# Patient Record
Sex: MALE | Race: BLACK OR AFRICAN AMERICAN | NOT HISPANIC OR LATINO | Employment: FULL TIME | URBAN - METROPOLITAN AREA
[De-identification: names, ages, dates, MRNs, and addresses within clinical notes are randomized per-mention and may not be internally consistent; named-entity substitution may affect disease eponyms.]

---

## 2020-08-29 ENCOUNTER — LAB VISIT (OUTPATIENT)
Dept: PRIMARY CARE CLINIC | Facility: OTHER | Age: 55
End: 2020-08-29
Attending: INTERNAL MEDICINE

## 2020-08-29 DIAGNOSIS — Z03.818 ENCOUNTER FOR OBSERVATION FOR SUSPECTED EXPOSURE TO OTHER BIOLOGICAL AGENTS RULED OUT: Primary | ICD-10-CM

## 2020-08-29 PROCEDURE — U0003 INFECTIOUS AGENT DETECTION BY NUCLEIC ACID (DNA OR RNA); SEVERE ACUTE RESPIRATORY SYNDROME CORONAVIRUS 2 (SARS-COV-2) (CORONAVIRUS DISEASE [COVID-19]), AMPLIFIED PROBE TECHNIQUE, MAKING USE OF HIGH THROUGHPUT TECHNOLOGIES AS DESCRIBED BY CMS-2020-01-R: HCPCS

## 2020-08-30 DIAGNOSIS — U07.1 COVID-19 VIRUS DETECTED: ICD-10-CM

## 2020-08-30 LAB — SARS-COV-2 RNA RESP QL NAA+PROBE: DETECTED

## 2024-01-09 ENCOUNTER — OFFICE VISIT (OUTPATIENT)
Dept: UROLOGY | Facility: CLINIC | Age: 59
End: 2024-01-09
Payer: COMMERCIAL

## 2024-01-09 ENCOUNTER — LAB VISIT (OUTPATIENT)
Dept: LAB | Facility: HOSPITAL | Age: 59
End: 2024-01-09
Attending: NURSE PRACTITIONER
Payer: COMMERCIAL

## 2024-01-09 VITALS
SYSTOLIC BLOOD PRESSURE: 114 MMHG | RESPIRATION RATE: 16 BRPM | DIASTOLIC BLOOD PRESSURE: 86 MMHG | WEIGHT: 246.5 LBS | HEART RATE: 96 BPM | BODY MASS INDEX: 35.29 KG/M2 | HEIGHT: 70 IN

## 2024-01-09 DIAGNOSIS — C61 PROSTATE CANCER: Primary | ICD-10-CM

## 2024-01-09 DIAGNOSIS — C61 PROSTATE CANCER: ICD-10-CM

## 2024-01-09 PROCEDURE — 36415 COLL VENOUS BLD VENIPUNCTURE: CPT | Performed by: NURSE PRACTITIONER

## 2024-01-09 PROCEDURE — 99205 OFFICE O/P NEW HI 60 MIN: CPT | Mod: S$GLB,,, | Performed by: NURSE PRACTITIONER

## 2024-01-09 PROCEDURE — 1159F MED LIST DOCD IN RCRD: CPT | Mod: CPTII,S$GLB,, | Performed by: NURSE PRACTITIONER

## 2024-01-09 PROCEDURE — 1160F RVW MEDS BY RX/DR IN RCRD: CPT | Mod: CPTII,S$GLB,, | Performed by: NURSE PRACTITIONER

## 2024-01-09 PROCEDURE — 3074F SYST BP LT 130 MM HG: CPT | Mod: CPTII,S$GLB,, | Performed by: NURSE PRACTITIONER

## 2024-01-09 PROCEDURE — 3008F BODY MASS INDEX DOCD: CPT | Mod: CPTII,S$GLB,, | Performed by: NURSE PRACTITIONER

## 2024-01-09 PROCEDURE — 84153 ASSAY OF PSA TOTAL: CPT | Performed by: NURSE PRACTITIONER

## 2024-01-09 PROCEDURE — 4010F ACE/ARB THERAPY RXD/TAKEN: CPT | Mod: CPTII,S$GLB,, | Performed by: NURSE PRACTITIONER

## 2024-01-09 PROCEDURE — 99999 PR PBB SHADOW E&M-NEW PATIENT-LVL IV: CPT | Mod: PBBFAC,,, | Performed by: NURSE PRACTITIONER

## 2024-01-09 PROCEDURE — 3079F DIAST BP 80-89 MM HG: CPT | Mod: CPTII,S$GLB,, | Performed by: NURSE PRACTITIONER

## 2024-01-09 RX ORDER — OMEPRAZOLE 20 MG/1
20 TABLET, DELAYED RELEASE ORAL DAILY
COMMUNITY

## 2024-01-09 RX ORDER — AMLODIPINE BESYLATE 10 MG/1
10 TABLET ORAL DAILY
COMMUNITY

## 2024-01-09 RX ORDER — METFORMIN HYDROCHLORIDE 500 MG/1
500 TABLET ORAL 2 TIMES DAILY WITH MEALS
COMMUNITY

## 2024-01-09 RX ORDER — HYDROCHLOROTHIAZIDE 12.5 MG/1
12.5 TABLET ORAL DAILY
COMMUNITY

## 2024-01-09 RX ORDER — METOPROLOL TARTRATE AND HYDROCHLOROTHIAZIDE 50; 25 MG/1; MG/1
1 TABLET ORAL DAILY
COMMUNITY

## 2024-01-09 RX ORDER — LOSARTAN POTASSIUM 50 MG/1
50 TABLET ORAL DAILY
COMMUNITY

## 2024-01-09 RX ORDER — TAMSULOSIN HYDROCHLORIDE 0.4 MG/1
CAPSULE ORAL DAILY
COMMUNITY

## 2024-01-09 NOTE — PROGRESS NOTES
Chief Complaint:   History of prostate cancer  History of renal stones  BPH  Erectile dysfunction    HPI:   Wilner Senior is a 58 y.o. male hx kidney stones, prostate cancer, erectile dysfunction and BPH. History of bilateral inguinal hernia repairs, is presenting today for possible exacerbation of right inguinal hernia.  Also history of prostate cancer currently undergoing brachytherapy and external beam radiation followed by Dr. Jeremías Leahy.  Diagnosed 12/27 2021.  States that he is changing urologists and providers to [facility].  Urine in clinic is negative PVR is 11 mL.  Denies gross hematuria.  Using daily Cialis with good results.  Denies adverse side effects to Cialis.  Is currently on tamsulosin with a complete resolution to BPH symptoms without adverse side effects.  History of renal stones, has been asymptomatic for 12 months. S/P ESWL and laser lithotripsy with stent placement.  Allergies:  Patient has no known allergies.    Medications:  has a current medication list which includes the following prescription(s): amlodipine, hydrochlorothiazide, losartan, metformin, metoprolol ta-hydrochlorothiaz, omeprazole, and tamsulosin.    Review of Systems:  General: No fever, chills, fatigability, or weight loss.  Skin: No rashes, itching, or changes in color or texture of skin.  Chest: Denies GREEN, cyanosis, wheezing, cough, and sputum production.  Abdomen: Appetite fine. No weight loss. Denies diarrhea, abdominal pain, hematemesis, or blood in stool.  Musculoskeletal: No joint stiffness or swelling. Denies back pain.  : As above.  All other review of systems negative.    PMH:  Essential hypertension   GERD (gastroesophageal reflux disease)   Arrhythmia   Controlled type 2 diabetes mellitus without complication (HCC)   Prostate cancer (HCC)   Renal stones    PSH:  Procedure Laterality Date   HERNIA REPAIR   x2   LITHOTRIPSY   ESWL x 1  Fracture right leg and right ring finger both treated without surgery  FamHx:    none  SocHx:  reports that he does not drink alcohol and does not use drugs. No history on file for tobacco use.      Physical Exam:  Vitals:    01/09/24 0926   BP: 114/86   Pulse: 96   Resp: 16     General: A&Ox3, no apparent distress, no deformities  Neck: No masses, normal thyroid  Lungs: normal inspiration, no use of accessory muscles  Heart: normal pulse, no arrhythmias  Abdomen: Soft, NT, ND, no masses, no hernias, no hepatosplenomegaly  Lymphatic: Neck and groin nodes negative  Skin: The skin is warm and dry. No jaundice.  Ext: No c/c/e.  : Test desc erika, no abnormalities of epididymus. Penis  with normal penile and scrotal skin. Meatus normal.  Rectal exam, deferred per patient     Labs/Studies:   See HPi    Impression/Plan:   1. History of prostate cancer  Will proceed with PSA labs today.  Patient to return to clinic in 1 week with medical records to review.  Patient has an extensive urological history.  Medical records will be updated and scanned into his EMR.    2. BPH  Remain on tamsulosin, see HPI    3. History of renal stones  Has been completely asymptomatic over the last 12 months.    4. Erectile dysfunction  Is currently taking daily Cialis with good results without adverse side effects.    5. History of bilateral inguinal hernia repair  Exam was unremarkable secondary to possible inguinal hernia exacerbation.    Radiation oncology consultation note (01/12/2022)  Hollie Emanuel MD  [facility]    Prostate biopsy showing Geason's 3+ 4   Brachytherapy   No androgen suppression was indicated

## 2024-01-10 ENCOUNTER — TELEPHONE (OUTPATIENT)
Dept: UROLOGY | Facility: CLINIC | Age: 59
End: 2024-01-10
Payer: COMMERCIAL

## 2024-01-10 DIAGNOSIS — C61 PROSTATE CANCER: Primary | ICD-10-CM

## 2024-01-10 LAB — COMPLEXED PSA SERPL-MCNC: 0.41 NG/ML (ref 0–4)

## 2024-01-16 ENCOUNTER — OFFICE VISIT (OUTPATIENT)
Dept: UROLOGY | Facility: CLINIC | Age: 59
End: 2024-01-16
Payer: COMMERCIAL

## 2024-01-16 DIAGNOSIS — C61 PROSTATE CANCER: Primary | ICD-10-CM

## 2024-01-16 PROCEDURE — 99214 OFFICE O/P EST MOD 30 MIN: CPT | Mod: S$GLB,,, | Performed by: NURSE PRACTITIONER

## 2024-01-16 PROCEDURE — 4010F ACE/ARB THERAPY RXD/TAKEN: CPT | Mod: CPTII,S$GLB,, | Performed by: NURSE PRACTITIONER

## 2024-01-16 PROCEDURE — 99999 PR PBB SHADOW E&M-EST. PATIENT-LVL II: CPT | Mod: PBBFAC,,, | Performed by: NURSE PRACTITIONER

## 2024-01-16 NOTE — PROGRESS NOTES
Chief Complaint:   Prostate cancer    HPI:   Patient is presenting as a follow-up to PSA.  Has history of prostate cancer;currently undergoing brachytherapy and external beam radiation followed by Dr. Jeremías Leahy.  Diagnosed 12/27 2021.  Patient brought in medical records secondary to radiation oncology plan of care.  Was reviewed and scanned into chart.  Recent PSA was 0.41.  01/09/2024  Wilner Senior is a 58 y.o. male hx kidney stones, prostate cancer, erectile dysfunction and BPH. History of bilateral inguinal hernia repairs, is presenting today for possible exacerbation of right inguinal hernia.  Also history of prostate cancer currently undergoing brachytherapy and external beam radiation followed by Dr. Jeremías Leahy.  Diagnosed 12/27 2021.  States that he is changing urologists and providers to [facility].  Urine in clinic is negative PVR is 11 mL.  Denies gross hematuria.  Using daily Cialis with good results.  Denies adverse side effects to Cialis.  Is currently on tamsulosin with a complete resolution to BPH symptoms without adverse side effects.  History of renal stones, has been asymptomatic for 12 months. S/P ESWL and laser lithotripsy with stent placement.   Allergies:  Patient has no known allergies.    Medications:  has a current medication list which includes the following prescription(s): amlodipine, hydrochlorothiazide, losartan, metformin, metoprolol ta-hydrochlorothiaz, omeprazole, and tamsulosin.    Review of Systems:  General: No fever, chills, fatigability, or weight loss.  Skin: No rashes, itching, or changes in color or texture of skin.  Chest: Denies GREEN, cyanosis, wheezing, cough, and sputum production.  Abdomen: Appetite fine. No weight loss. Denies diarrhea, abdominal pain, hematemesis, or blood in stool.  Musculoskeletal: No joint stiffness or swelling. Denies back pain.  : As above.  All other review of systems negative.    PMH:  Essential hypertension   GERD (gastroesophageal  reflux disease)   Arrhythmia   Controlled type 2 diabetes mellitus without complication (HCC)   Prostate cancer (HCC)   Renal stones     PSH:  Procedure Laterality Date   HERNIA REPAIR   x2   LITHOTRIPSY   ESWL x 1  Fracture right leg and right ring finger both treated without surgery  FamHx:   none  SocHx:  reports that he does not drink alcohol and does not use drugs. No history on file for tobacco use.      Physical Exam:  General: A&Ox3, no apparent distress, no deformities  Neck: No masses, normal thyroid  Lungs: normal inspiration, no use of accessory muscles  Heart: normal pulse, no arrhythmias  Abdomen: Soft, NT, ND, no masses, no hernias, no hepatosplenomegaly  Lymphatic: Neck and groin nodes negative    Labs/Studies:    Latest Reference Range & Units 01/09/24 10:10   PSA Diagnostic 0.00 - 4.00 ng/mL 0.41     Impression/Plan:      Radiation oncology consultation note (01/12/2022)  Hollie Emanuel MD  [facility]     Prostate biopsy showing Geason's 3+ 4   Brachytherapy   No androgen suppression was indicated    Reviewed recent PSA with patient and wife.  Will repeat PSA in 6 months and patient to return to clinic to review.  Will bring in urologist medical records for review, when available.  I will review medical records and information will be scanned into chart.

## 2024-03-20 ENCOUNTER — LAB VISIT (OUTPATIENT)
Dept: LAB | Facility: HOSPITAL | Age: 59
End: 2024-03-20
Attending: NURSE PRACTITIONER
Payer: COMMERCIAL

## 2024-03-20 ENCOUNTER — OFFICE VISIT (OUTPATIENT)
Dept: UROLOGY | Facility: CLINIC | Age: 59
End: 2024-03-20
Payer: COMMERCIAL

## 2024-03-20 VITALS
HEART RATE: 87 BPM | WEIGHT: 246.5 LBS | SYSTOLIC BLOOD PRESSURE: 121 MMHG | DIASTOLIC BLOOD PRESSURE: 73 MMHG | BODY MASS INDEX: 35.37 KG/M2

## 2024-03-20 DIAGNOSIS — R31.0 HEMATURIA, GROSS: ICD-10-CM

## 2024-03-20 DIAGNOSIS — R31.0 HEMATURIA, GROSS: Primary | ICD-10-CM

## 2024-03-20 LAB
CREAT SERPL-MCNC: 1 MG/DL (ref 0.5–1.4)
EST. GFR  (NO RACE VARIABLE): >60 ML/MIN/1.73 M^2

## 2024-03-20 PROCEDURE — 1159F MED LIST DOCD IN RCRD: CPT | Mod: CPTII,S$GLB,, | Performed by: NURSE PRACTITIONER

## 2024-03-20 PROCEDURE — 36415 COLL VENOUS BLD VENIPUNCTURE: CPT | Performed by: NURSE PRACTITIONER

## 2024-03-20 PROCEDURE — 3008F BODY MASS INDEX DOCD: CPT | Mod: CPTII,S$GLB,, | Performed by: NURSE PRACTITIONER

## 2024-03-20 PROCEDURE — 3074F SYST BP LT 130 MM HG: CPT | Mod: CPTII,S$GLB,, | Performed by: NURSE PRACTITIONER

## 2024-03-20 PROCEDURE — 82565 ASSAY OF CREATININE: CPT | Performed by: NURSE PRACTITIONER

## 2024-03-20 PROCEDURE — 99999 PR PBB SHADOW E&M-EST. PATIENT-LVL III: CPT | Mod: PBBFAC,,, | Performed by: NURSE PRACTITIONER

## 2024-03-20 PROCEDURE — 4010F ACE/ARB THERAPY RXD/TAKEN: CPT | Mod: CPTII,S$GLB,, | Performed by: NURSE PRACTITIONER

## 2024-03-20 PROCEDURE — 99214 OFFICE O/P EST MOD 30 MIN: CPT | Mod: S$GLB,,, | Performed by: NURSE PRACTITIONER

## 2024-03-20 PROCEDURE — 3078F DIAST BP <80 MM HG: CPT | Mod: CPTII,S$GLB,, | Performed by: NURSE PRACTITIONER

## 2024-03-20 NOTE — PROGRESS NOTES
Chief Complaint:   Prostate cancer   Gross hematuria    HPI:   Patient is presenting with complaints of painless, gross hematuria, several episodes over the weekend.  Has a history of prostate cancer, currently undergoing brachytherapy and external beam radiation followed by Dr. Jeremías Leahy. Diagnosed 12/27/2021.  Patient has a remote history of renal stones.  Denies flank pain.  Urine in clinic is negative.  No history of smoking.  01/16/2024  Patient is presenting as a follow-up to PSA.  Has history of prostate cancer;currently undergoing brachytherapy and external beam radiation followed by Dr. Jeremías Leahy.  Diagnosed 12/27/2021.  Patient brought in medical records secondary to radiation oncology plan of care.  Was reviewed and scanned into chart.  Recent PSA was 0.41.  01/09/2024  Wilner Senior is a 58 y.o. male hx kidney stones, prostate cancer, erectile dysfunction and BPH. History of bilateral inguinal hernia repairs, is presenting today for possible exacerbation of right inguinal hernia.  Also history of prostate cancer currently undergoing brachytherapy and external beam radiation followed by Dr. Jeremías Leahy.  Diagnosed 12/27 2021.  States that he is changing urologists and providers to [facility].  Urine in clinic is negative PVR is 11 mL.  Denies gross hematuria.  Using daily Cialis with good results.  Denies adverse side effects to Cialis.  Is currently on tamsulosin with a complete resolution to BPH symptoms without adverse side effects.  History of renal stones, has been asymptomatic for 12 months. S/P ESWL and laser lithotripsy with stent placement.   Allergies:  Patient has no known allergies.     Medications:  has a current medication list which includes the following prescription(s): amlodipine, hydrochlorothiazide, losartan, metformin, metoprolol ta-hydrochlorothiaz, omeprazole, and tamsulosin.     Review of Systems:  General: No fever, chills, fatigability, or weight loss.  Skin: No rashes,  itching, or changes in color or texture of skin.  Chest: Denies GREEN, cyanosis, wheezing, cough, and sputum production.  Abdomen: Appetite fine. No weight loss. Denies diarrhea, abdominal pain, hematemesis, or blood in stool.  Musculoskeletal: No joint stiffness or swelling. Denies back pain.  : As above.  All other review of systems negative.     PMH:  Essential hypertension   GERD (gastroesophageal reflux disease)   Arrhythmia   Controlled type 2 diabetes mellitus without complication (HCC)   Prostate cancer (HCC)   Renal stones     PSH:  Procedure Laterality Date   HERNIA REPAIR   x2   LITHOTRIPSY   ESWL x 1  Fracture right leg and right ring finger both treated without surgery  FamHx:   none  SocHx:  reports that he does not drink alcohol and does not use drugs. No history on file for tobacco use.       Physical Exam:  General: A&Ox3, no apparent distress, no deformities  Neck: No masses, normal thyroid  Lungs: normal inspiration, no use of accessory muscles  Heart: normal pulse, no arrhythmias  Abdomen: Soft, NT, ND, no masses, no hernias, no hepatosplenomegaly  Lymphatic: Neck and groin nodes negative     Labs/Studies:     Latest Reference Range & Units 01/09/24 10:10   PSA Diagnostic 0.00 - 4.00 ng/mL 0.41      Impression/Plan:   Radiation oncology consultation note (01/12/2022)  Hollie Emanuel MD  [facility]     Prostate biopsy showing Geason's 3+ 4   Brachytherapy   No androgen suppression was indicated     Reviewed recent PSA with patient and wife.  Will repeat PSA in 6 months and patient to return to clinic to review.  Will bring in urologist medical records for review, when available.  I will review medical records and information will be scanned into chart.    Gross hematuria with a history of prostate cancer  Will proceed with CT urogram and patient to return to clinic to discuss results with Dr. Awad.  Patient is undergoing prostate cancer treatment at this time, he is aware that  gross hematuria could be related to radiation cystitis.

## 2024-03-26 ENCOUNTER — TELEPHONE (OUTPATIENT)
Dept: UROLOGY | Facility: CLINIC | Age: 59
End: 2024-03-26
Payer: COMMERCIAL

## 2024-03-26 NOTE — TELEPHONE ENCOUNTER
I called and left a detailed message for the patient.     ----- Message from Donna Cruz NP sent at 3/26/2024  8:26 AM CDT -----  Please call   Kidney function labs are normal.

## 2024-04-03 ENCOUNTER — OFFICE VISIT (OUTPATIENT)
Dept: RADIATION ONCOLOGY | Facility: CLINIC | Age: 59
End: 2024-04-03
Payer: COMMERCIAL

## 2024-04-03 VITALS
WEIGHT: 242.5 LBS | SYSTOLIC BLOOD PRESSURE: 121 MMHG | DIASTOLIC BLOOD PRESSURE: 81 MMHG | OXYGEN SATURATION: 96 % | TEMPERATURE: 98 F | BODY MASS INDEX: 34.72 KG/M2 | RESPIRATION RATE: 18 BRPM | HEIGHT: 70 IN | HEART RATE: 78 BPM

## 2024-04-03 DIAGNOSIS — C61 PROSTATE CANCER: Primary | ICD-10-CM

## 2024-04-03 PROCEDURE — 3074F SYST BP LT 130 MM HG: CPT | Mod: CPTII,S$GLB,, | Performed by: SPECIALIST

## 2024-04-03 PROCEDURE — 3079F DIAST BP 80-89 MM HG: CPT | Mod: CPTII,S$GLB,, | Performed by: SPECIALIST

## 2024-04-03 PROCEDURE — 4010F ACE/ARB THERAPY RXD/TAKEN: CPT | Mod: CPTII,S$GLB,, | Performed by: SPECIALIST

## 2024-04-03 PROCEDURE — 99212 OFFICE O/P EST SF 10 MIN: CPT | Mod: S$GLB,,, | Performed by: SPECIALIST

## 2024-04-03 PROCEDURE — 99999 PR PBB SHADOW E&M-EST. PATIENT-LVL IV: CPT | Mod: PBBFAC,,, | Performed by: SPECIALIST

## 2024-04-03 PROCEDURE — 3008F BODY MASS INDEX DOCD: CPT | Mod: CPTII,S$GLB,, | Performed by: SPECIALIST

## 2024-04-03 PROCEDURE — 1159F MED LIST DOCD IN RCRD: CPT | Mod: CPTII,S$GLB,, | Performed by: SPECIALIST

## 2024-04-03 NOTE — PROGRESS NOTES
Mr. Senior returns for follow-up after undergoing combined modality therapy with external beam radiotherapy and a palladium 103 implant as a boost on 04/27/2022.  He continues to do well since his implant with a recent PSA measured at 0.46.  He has recently had hematuria but he just passed a stone and it has resolved.  He has no complaints referable to his bladder or rectum.  He has good erectile function.  Impression:  He is doing well after his implant.  Plan: I will see him back in 1 year.  I have recommended beginning then that he ever rectal exam annually.  I certainly appreciate participating in this delightful gentleman's care.

## 2024-05-22 DIAGNOSIS — C61 PROSTATE CANCER: Primary | ICD-10-CM

## 2024-05-22 RX ORDER — TAMSULOSIN HYDROCHLORIDE 0.4 MG/1
0.4 CAPSULE ORAL DAILY
Qty: 90 CAPSULE | Refills: 3 | Status: SHIPPED | OUTPATIENT
Start: 2024-05-22

## 2024-07-09 ENCOUNTER — LAB VISIT (OUTPATIENT)
Dept: LAB | Facility: HOSPITAL | Age: 59
End: 2024-07-09
Attending: NURSE PRACTITIONER
Payer: COMMERCIAL

## 2024-07-09 DIAGNOSIS — C61 PROSTATE CANCER: ICD-10-CM

## 2024-07-09 LAB — COMPLEXED PSA SERPL-MCNC: 0.33 NG/ML (ref 0–4)

## 2024-07-09 PROCEDURE — 36415 COLL VENOUS BLD VENIPUNCTURE: CPT | Performed by: NURSE PRACTITIONER

## 2024-07-09 PROCEDURE — 84153 ASSAY OF PSA TOTAL: CPT | Performed by: NURSE PRACTITIONER

## 2024-07-10 ENCOUNTER — TELEPHONE (OUTPATIENT)
Dept: UROLOGY | Facility: CLINIC | Age: 59
End: 2024-07-10
Payer: COMMERCIAL

## 2024-07-10 NOTE — TELEPHONE ENCOUNTER
I called and notified patient of his labs, patient was informed of upcoming appointment. Duplicate appointment cancelled.     ----- Message from Donna Cruz NP sent at 7/10/2024  8:09 AM CDT -----  Please call patient and inform him that PSA is normal secondary to prostate cancer treatment, 0.33.  Has an upcoming appointment next week, in fact has to appointments.  Please ask him which appointment he wants to keep.

## 2024-09-19 DIAGNOSIS — C61 PROSTATE CANCER: Primary | ICD-10-CM

## 2024-09-19 RX ORDER — TAMSULOSIN HYDROCHLORIDE 0.4 MG/1
0.8 CAPSULE ORAL DAILY
Qty: 60 CAPSULE | Refills: 11 | Status: SHIPPED | OUTPATIENT
Start: 2024-09-19 | End: 2025-09-19

## 2024-10-08 DIAGNOSIS — C61 PROSTATE CANCER: Primary | ICD-10-CM

## 2024-10-08 RX ORDER — TADALAFIL 5 MG/1
5 TABLET ORAL DAILY PRN
Qty: 30 TABLET | Refills: 11 | Status: SHIPPED | OUTPATIENT
Start: 2024-10-08 | End: 2025-10-08

## 2024-11-19 ENCOUNTER — PATIENT MESSAGE (OUTPATIENT)
Dept: NEUROLOGY | Facility: CLINIC | Age: 59
End: 2024-11-19
Payer: COMMERCIAL

## 2025-03-03 ENCOUNTER — OFFICE VISIT (OUTPATIENT)
Dept: RADIATION ONCOLOGY | Facility: CLINIC | Age: 60
End: 2025-03-03
Payer: COMMERCIAL

## 2025-03-03 VITALS
HEART RATE: 71 BPM | SYSTOLIC BLOOD PRESSURE: 111 MMHG | WEIGHT: 231.25 LBS | DIASTOLIC BLOOD PRESSURE: 75 MMHG | BODY MASS INDEX: 33.11 KG/M2 | TEMPERATURE: 98 F | OXYGEN SATURATION: 96 % | RESPIRATION RATE: 18 BRPM | HEIGHT: 70 IN

## 2025-03-03 DIAGNOSIS — C61 PROSTATE CANCER: Primary | ICD-10-CM

## 2025-03-03 PROCEDURE — 99212 OFFICE O/P EST SF 10 MIN: CPT | Mod: S$GLB,,, | Performed by: SPECIALIST

## 2025-03-03 PROCEDURE — 99999 PR PBB SHADOW E&M-EST. PATIENT-LVL III: CPT | Mod: PBBFAC,,, | Performed by: SPECIALIST

## 2025-03-03 PROCEDURE — 1159F MED LIST DOCD IN RCRD: CPT | Mod: CPTII,S$GLB,, | Performed by: SPECIALIST

## 2025-03-03 PROCEDURE — 3074F SYST BP LT 130 MM HG: CPT | Mod: CPTII,S$GLB,, | Performed by: SPECIALIST

## 2025-03-03 PROCEDURE — 3008F BODY MASS INDEX DOCD: CPT | Mod: CPTII,S$GLB,, | Performed by: SPECIALIST

## 2025-03-03 PROCEDURE — 3078F DIAST BP <80 MM HG: CPT | Mod: CPTII,S$GLB,, | Performed by: SPECIALIST

## 2025-03-03 NOTE — PROGRESS NOTES
Mr. Serrano is 3 years out from combined external beam and permanent prostate brachytherapy.  He continues to do well and has no complaints referable to his bladder or rectum.  He has good sexual function.  A PSA in December was 0.2.  Physical exam:  On rectal exam his prostate is flat.  The rest of the exam is unremarkable.  Impression: He continues to do well.  Plan: I will see him back in 1 year's time.  I appreciate participating in this delightful gentleman's care.